# Patient Record
Sex: MALE | Race: WHITE | NOT HISPANIC OR LATINO | Employment: FULL TIME | URBAN - METROPOLITAN AREA
[De-identification: names, ages, dates, MRNs, and addresses within clinical notes are randomized per-mention and may not be internally consistent; named-entity substitution may affect disease eponyms.]

---

## 2022-05-11 ENCOUNTER — NURSE TRIAGE (OUTPATIENT)
Dept: NURSING | Facility: CLINIC | Age: 32
End: 2022-05-11
Payer: COMMERCIAL

## 2022-05-11 ENCOUNTER — OFFICE VISIT (OUTPATIENT)
Dept: URGENT CARE | Facility: URGENT CARE | Age: 32
End: 2022-05-11
Payer: COMMERCIAL

## 2022-05-11 VITALS
OXYGEN SATURATION: 98 % | RESPIRATION RATE: 20 BRPM | TEMPERATURE: 98 F | DIASTOLIC BLOOD PRESSURE: 78 MMHG | HEART RATE: 78 BPM | SYSTOLIC BLOOD PRESSURE: 119 MMHG

## 2022-05-11 DIAGNOSIS — K51.011 ULCERATIVE PANCOLITIS WITH RECTAL BLEEDING (H): Primary | ICD-10-CM

## 2022-05-11 LAB
BASOPHILS # BLD AUTO: 0.1 10E3/UL (ref 0–0.2)
BASOPHILS NFR BLD AUTO: 1 %
EOSINOPHIL # BLD AUTO: 0.3 10E3/UL (ref 0–0.7)
EOSINOPHIL NFR BLD AUTO: 4 %
ERYTHROCYTE [DISTWIDTH] IN BLOOD BY AUTOMATED COUNT: 13.4 % (ref 10–15)
HCT VFR BLD AUTO: 25.1 % (ref 40–53)
HGB BLD-MCNC: 7.5 G/DL (ref 13.3–17.7)
LYMPHOCYTES # BLD AUTO: 1.2 10E3/UL (ref 0.8–5.3)
LYMPHOCYTES NFR BLD AUTO: 20 %
MCH RBC QN AUTO: 24.8 PG (ref 26.5–33)
MCHC RBC AUTO-ENTMCNC: 29.9 G/DL (ref 31.5–36.5)
MCV RBC AUTO: 83 FL (ref 78–100)
MONOCYTES # BLD AUTO: 1.2 10E3/UL (ref 0–1.3)
MONOCYTES NFR BLD AUTO: 19 %
NEUTROPHILS # BLD AUTO: 3.4 10E3/UL (ref 1.6–8.3)
NEUTROPHILS NFR BLD AUTO: 55 %
PLATELET # BLD AUTO: 493 10E3/UL (ref 150–450)
RBC # BLD AUTO: 3.02 10E6/UL (ref 4.4–5.9)
WBC # BLD AUTO: 6.1 10E3/UL (ref 4–11)

## 2022-05-11 PROCEDURE — 85025 COMPLETE CBC W/AUTO DIFF WBC: CPT | Performed by: PHYSICIAN ASSISTANT

## 2022-05-11 PROCEDURE — 99204 OFFICE O/P NEW MOD 45 MIN: CPT | Performed by: PHYSICIAN ASSISTANT

## 2022-05-11 PROCEDURE — 36415 COLL VENOUS BLD VENIPUNCTURE: CPT | Performed by: PHYSICIAN ASSISTANT

## 2022-05-11 NOTE — TELEPHONE ENCOUNTER
Patient and wife calling to report reoccuring symptoms of previously diagnosed ulcerative colitis. Having five loose bloody stools a day.  Started mild 5-6-22 and progressed.  Denies vomiting, dizziness, fever.  Having abdominal pain 5-8 that varies during day.    Disposition is to ER.  Verbalizes understanding, but may choose to got to UC.    Lesa Butler RN  [facility]      Reason for Disposition    [1] Blood in the stool AND [2] moderate or large amount of blood    Additional Information    Negative: Shock suspected (e.g., cold/pale/clammy skin, too weak to stand, low BP, rapid pulse)    Negative: Difficult to awaken or acting confused (e.g., disoriented, slurred speech)    Negative: Sounds like a life-threatening emergency to the triager    Negative: Vomiting also present and worse than the diarrhea    Negative: [1] Blood in stool AND [2] without diarrhea    Negative: Diarrhea in a cancer patient who is currently (or recently) receiving chemotherapy or radiation therapy, or cancer patient who has metastatic or end-stage cancer and is receiving palliative care    Negative: [1] SEVERE abdominal pain (e.g., excruciating) AND [2] present > 1 hour    Negative: [1] SEVERE abdominal pain AND [2] age > 60    Protocols used: DIARRHEA-A-

## 2022-05-12 ENCOUNTER — APPOINTMENT (OUTPATIENT)
Dept: CT IMAGING | Facility: CLINIC | Age: 32
End: 2022-05-12
Attending: EMERGENCY MEDICINE
Payer: COMMERCIAL

## 2022-05-12 ENCOUNTER — HOSPITAL ENCOUNTER (OUTPATIENT)
Facility: CLINIC | Age: 32
Setting detail: OBSERVATION
Discharge: HOME | End: 2022-05-14
Attending: EMERGENCY MEDICINE | Admitting: INTERNAL MEDICINE
Payer: COMMERCIAL

## 2022-05-12 ENCOUNTER — APPOINTMENT (OUTPATIENT)
Dept: GENERAL RADIOLOGY | Facility: CLINIC | Age: 32
End: 2022-05-12
Attending: EMERGENCY MEDICINE
Payer: COMMERCIAL

## 2022-05-12 DIAGNOSIS — R52 PAIN: Primary | ICD-10-CM

## 2022-05-12 DIAGNOSIS — K51.919: ICD-10-CM

## 2022-05-12 DIAGNOSIS — D64.9 ANEMIA, UNSPECIFIED TYPE: ICD-10-CM

## 2022-05-12 LAB
ABO/RH(D): NORMAL
ABO/RH(D): NORMAL
ALBUMIN SERPL-MCNC: 3.3 G/DL (ref 3.4–5)
ALP SERPL-CCNC: 54 U/L (ref 40–150)
ALT SERPL W P-5'-P-CCNC: 18 U/L (ref 0–70)
ANION GAP SERPL CALCULATED.3IONS-SCNC: 6 MMOL/L (ref 3–14)
ANTIBODY SCREEN: NEGATIVE
AST SERPL W P-5'-P-CCNC: 15 U/L (ref 0–45)
BILIRUB DIRECT SERPL-MCNC: 0.1 MG/DL (ref 0–0.2)
BILIRUB SERPL-MCNC: 0.3 MG/DL (ref 0.2–1.3)
BUN SERPL-MCNC: 11 MG/DL (ref 7–30)
C COLI+JEJUNI+LARI FUSA STL QL NAA+PROBE: NOT DETECTED
C DIFF TOX B STL QL: NEGATIVE
CALCIUM SERPL-MCNC: 8.5 MG/DL (ref 8.5–10.1)
CHLORIDE BLD-SCNC: 105 MMOL/L (ref 94–109)
CO2 SERPL-SCNC: 25 MMOL/L (ref 20–32)
CREAT SERPL-MCNC: 0.94 MG/DL (ref 0.66–1.25)
CRP SERPL-MCNC: 16.4 MG/L (ref 0–8)
DEPRECATED CALCIDIOL+CALCIFEROL SERPL-MC: 16 UG/L (ref 20–75)
EC STX1 GENE STL QL NAA+PROBE: NOT DETECTED
EC STX2 GENE STL QL NAA+PROBE: NOT DETECTED
ERYTHROCYTE [DISTWIDTH] IN BLOOD BY AUTOMATED COUNT: 13.8 % (ref 10–15)
GFR SERPL CREATININE-BSD FRML MDRD: >90 ML/MIN/1.73M2
GLUCOSE BLD-MCNC: 92 MG/DL (ref 70–99)
HBV CORE IGM SERPL QL IA: NONREACTIVE
HBV SURFACE AB SERPL IA-ACNC: 29.64 M[IU]/ML
HCT VFR BLD AUTO: 28 % (ref 40–53)
HCV AB SERPL QL IA: NONREACTIVE
HGB BLD-MCNC: 7.5 G/DL (ref 13.3–17.7)
HGB BLD-MCNC: 8.4 G/DL (ref 13.3–17.7)
HOLD SPECIMEN: NORMAL
MCH RBC QN AUTO: 25 PG (ref 26.5–33)
MCHC RBC AUTO-ENTMCNC: 30 G/DL (ref 31.5–36.5)
MCV RBC AUTO: 83 FL (ref 78–100)
NOROV GI+II ORF1-ORF2 JNC STL QL NAA+PR: NOT DETECTED
PLATELET # BLD AUTO: 494 10E3/UL (ref 150–450)
POTASSIUM BLD-SCNC: 3.7 MMOL/L (ref 3.4–5.3)
PROT SERPL-MCNC: 6.6 G/DL (ref 6.8–8.8)
RBC # BLD AUTO: 3.36 10E6/UL (ref 4.4–5.9)
RVA NSP5 STL QL NAA+PROBE: NOT DETECTED
SALMONELLA SP RPOD STL QL NAA+PROBE: NOT DETECTED
SARS-COV-2 RNA RESP QL NAA+PROBE: NEGATIVE
SHIGELLA SP+EIEC IPAH STL QL NAA+PROBE: NOT DETECTED
SODIUM SERPL-SCNC: 136 MMOL/L (ref 133–144)
SPECIMEN EXPIRATION DATE: NORMAL
SPECIMEN EXPIRATION DATE: NORMAL
V CHOL+PARA RFBL+TRKH+TNAA STL QL NAA+PR: NOT DETECTED
WBC # BLD AUTO: 6.4 10E3/UL (ref 4–11)
Y ENTERO RECN STL QL NAA+PROBE: NOT DETECTED

## 2022-05-12 PROCEDURE — 36415 COLL VENOUS BLD VENIPUNCTURE: CPT | Performed by: PHYSICIAN ASSISTANT

## 2022-05-12 PROCEDURE — 96376 TX/PRO/DX INJ SAME DRUG ADON: CPT

## 2022-05-12 PROCEDURE — 99285 EMERGENCY DEPT VISIT HI MDM: CPT | Mod: 25

## 2022-05-12 PROCEDURE — 250N000011 HC RX IP 250 OP 636: Performed by: EMERGENCY MEDICINE

## 2022-05-12 PROCEDURE — 250N000013 HC RX MED GY IP 250 OP 250 PS 637: Performed by: NURSE PRACTITIONER

## 2022-05-12 PROCEDURE — 36415 COLL VENOUS BLD VENIPUNCTURE: CPT | Performed by: EMERGENCY MEDICINE

## 2022-05-12 PROCEDURE — 74177 CT ABD & PELVIS W/CONTRAST: CPT

## 2022-05-12 PROCEDURE — 86481 TB AG RESPONSE T-CELL SUSP: CPT | Performed by: NURSE PRACTITIONER

## 2022-05-12 PROCEDURE — 96375 TX/PRO/DX INJ NEW DRUG ADDON: CPT

## 2022-05-12 PROCEDURE — 86803 HEPATITIS C AB TEST: CPT | Performed by: NURSE PRACTITIONER

## 2022-05-12 PROCEDURE — G0378 HOSPITAL OBSERVATION PER HR: HCPCS

## 2022-05-12 PROCEDURE — 250N000011 HC RX IP 250 OP 636: Performed by: PHYSICIAN ASSISTANT

## 2022-05-12 PROCEDURE — 86705 HEP B CORE ANTIBODY IGM: CPT | Performed by: NURSE PRACTITIONER

## 2022-05-12 PROCEDURE — 258N000003 HC RX IP 258 OP 636: Performed by: PHYSICIAN ASSISTANT

## 2022-05-12 PROCEDURE — 86706 HEP B SURFACE ANTIBODY: CPT | Performed by: NURSE PRACTITIONER

## 2022-05-12 PROCEDURE — 87493 C DIFF AMPLIFIED PROBE: CPT | Mod: XU | Performed by: EMERGENCY MEDICINE

## 2022-05-12 PROCEDURE — 258N000003 HC RX IP 258 OP 636: Performed by: EMERGENCY MEDICINE

## 2022-05-12 PROCEDURE — 87506 IADNA-DNA/RNA PROBE TQ 6-11: CPT | Performed by: NURSE PRACTITIONER

## 2022-05-12 PROCEDURE — 82248 BILIRUBIN DIRECT: CPT | Performed by: EMERGENCY MEDICINE

## 2022-05-12 PROCEDURE — 85027 COMPLETE CBC AUTOMATED: CPT | Performed by: EMERGENCY MEDICINE

## 2022-05-12 PROCEDURE — 86140 C-REACTIVE PROTEIN: CPT | Performed by: NURSE PRACTITIONER

## 2022-05-12 PROCEDURE — 86901 BLOOD TYPING SEROLOGIC RH(D): CPT | Performed by: EMERGENCY MEDICINE

## 2022-05-12 PROCEDURE — 71045 X-RAY EXAM CHEST 1 VIEW: CPT

## 2022-05-12 PROCEDURE — 80053 COMPREHEN METABOLIC PANEL: CPT | Performed by: EMERGENCY MEDICINE

## 2022-05-12 PROCEDURE — C9803 HOPD COVID-19 SPEC COLLECT: HCPCS

## 2022-05-12 PROCEDURE — 120N000004 HC R&B MS OVERFLOW

## 2022-05-12 PROCEDURE — 99223 1ST HOSP IP/OBS HIGH 75: CPT | Mod: AI | Performed by: PHYSICIAN ASSISTANT

## 2022-05-12 PROCEDURE — 96374 THER/PROPH/DIAG INJ IV PUSH: CPT | Mod: XU

## 2022-05-12 PROCEDURE — 85018 HEMOGLOBIN: CPT | Performed by: PHYSICIAN ASSISTANT

## 2022-05-12 PROCEDURE — U0003 INFECTIOUS AGENT DETECTION BY NUCLEIC ACID (DNA OR RNA); SEVERE ACUTE RESPIRATORY SYNDROME CORONAVIRUS 2 (SARS-COV-2) (CORONAVIRUS DISEASE [COVID-19]), AMPLIFIED PROBE TECHNIQUE, MAKING USE OF HIGH THROUGHPUT TECHNOLOGIES AS DESCRIBED BY CMS-2020-01-R: HCPCS | Performed by: EMERGENCY MEDICINE

## 2022-05-12 PROCEDURE — 250N000013 HC RX MED GY IP 250 OP 250 PS 637: Performed by: PHYSICIAN ASSISTANT

## 2022-05-12 PROCEDURE — 250N000009 HC RX 250: Performed by: EMERGENCY MEDICINE

## 2022-05-12 PROCEDURE — 250N000013 HC RX MED GY IP 250 OP 250 PS 637: Performed by: EMERGENCY MEDICINE

## 2022-05-12 PROCEDURE — 36415 COLL VENOUS BLD VENIPUNCTURE: CPT | Performed by: NURSE PRACTITIONER

## 2022-05-12 PROCEDURE — 82306 VITAMIN D 25 HYDROXY: CPT | Performed by: NURSE PRACTITIONER

## 2022-05-12 RX ORDER — ACETAMINOPHEN 500 MG
1000 TABLET ORAL ONCE
Status: COMPLETED | OUTPATIENT
Start: 2022-05-12 | End: 2022-05-12

## 2022-05-12 RX ORDER — ACETAMINOPHEN 325 MG/1
650 TABLET ORAL EVERY 6 HOURS PRN
Status: DISCONTINUED | OUTPATIENT
Start: 2022-05-12 | End: 2022-05-14 | Stop reason: HOSPADM

## 2022-05-12 RX ORDER — ONDANSETRON 2 MG/ML
4 INJECTION INTRAMUSCULAR; INTRAVENOUS EVERY 30 MIN PRN
Status: DISCONTINUED | OUTPATIENT
Start: 2022-05-12 | End: 2022-05-12

## 2022-05-12 RX ORDER — OXYCODONE HYDROCHLORIDE 5 MG/1
5 TABLET ORAL EVERY 4 HOURS PRN
Status: DISCONTINUED | OUTPATIENT
Start: 2022-05-12 | End: 2022-05-14 | Stop reason: HOSPADM

## 2022-05-12 RX ORDER — HYDROMORPHONE HYDROCHLORIDE 1 MG/ML
0.5 INJECTION, SOLUTION INTRAMUSCULAR; INTRAVENOUS; SUBCUTANEOUS
Status: COMPLETED | OUTPATIENT
Start: 2022-05-12 | End: 2022-05-12

## 2022-05-12 RX ORDER — ONDANSETRON 4 MG/1
4 TABLET, ORALLY DISINTEGRATING ORAL EVERY 6 HOURS PRN
Status: DISCONTINUED | OUTPATIENT
Start: 2022-05-12 | End: 2022-05-14 | Stop reason: HOSPADM

## 2022-05-12 RX ORDER — LIDOCAINE 40 MG/G
CREAM TOPICAL
Status: DISCONTINUED | OUTPATIENT
Start: 2022-05-12 | End: 2022-05-14 | Stop reason: HOSPADM

## 2022-05-12 RX ORDER — HYDROMORPHONE HYDROCHLORIDE 1 MG/ML
0.5 INJECTION, SOLUTION INTRAMUSCULAR; INTRAVENOUS; SUBCUTANEOUS
Status: DISCONTINUED | OUTPATIENT
Start: 2022-05-12 | End: 2022-05-12

## 2022-05-12 RX ORDER — SODIUM CHLORIDE, SODIUM LACTATE, POTASSIUM CHLORIDE, CALCIUM CHLORIDE 600; 310; 30; 20 MG/100ML; MG/100ML; MG/100ML; MG/100ML
INJECTION, SOLUTION INTRAVENOUS CONTINUOUS
Status: DISCONTINUED | OUTPATIENT
Start: 2022-05-12 | End: 2022-05-14 | Stop reason: HOSPADM

## 2022-05-12 RX ORDER — IOPAMIDOL 755 MG/ML
500 INJECTION, SOLUTION INTRAVASCULAR ONCE
Status: COMPLETED | OUTPATIENT
Start: 2022-05-12 | End: 2022-05-12

## 2022-05-12 RX ORDER — ZOLPIDEM TARTRATE 5 MG/1
5 TABLET ORAL
Status: DISCONTINUED | OUTPATIENT
Start: 2022-05-12 | End: 2022-05-14 | Stop reason: HOSPADM

## 2022-05-12 RX ORDER — HYDROMORPHONE HCL IN WATER/PF 6 MG/30 ML
0.2 PATIENT CONTROLLED ANALGESIA SYRINGE INTRAVENOUS
Status: DISCONTINUED | OUTPATIENT
Start: 2022-05-12 | End: 2022-05-14 | Stop reason: HOSPADM

## 2022-05-12 RX ORDER — SODIUM CHLORIDE 9 MG/ML
INJECTION, SOLUTION INTRAVENOUS CONTINUOUS
Status: DISCONTINUED | OUTPATIENT
Start: 2022-05-12 | End: 2022-05-12

## 2022-05-12 RX ORDER — ONDANSETRON 2 MG/ML
4 INJECTION INTRAMUSCULAR; INTRAVENOUS EVERY 6 HOURS PRN
Status: DISCONTINUED | OUTPATIENT
Start: 2022-05-12 | End: 2022-05-14 | Stop reason: HOSPADM

## 2022-05-12 RX ORDER — UBIDECARENONE 75 MG
100 CAPSULE ORAL DAILY
Status: DISCONTINUED | OUTPATIENT
Start: 2022-05-12 | End: 2022-05-14 | Stop reason: HOSPADM

## 2022-05-12 RX ADMIN — VITAM B12 100 MCG: 100 TAB at 11:57

## 2022-05-12 RX ADMIN — ONDANSETRON 4 MG: 2 INJECTION INTRAMUSCULAR; INTRAVENOUS at 17:22

## 2022-05-12 RX ADMIN — ONDANSETRON 4 MG: 2 INJECTION INTRAMUSCULAR; INTRAVENOUS at 09:54

## 2022-05-12 RX ADMIN — IOPAMIDOL 81 ML: 755 INJECTION, SOLUTION INTRAVENOUS at 10:39

## 2022-05-12 RX ADMIN — HYDROMORPHONE HYDROCHLORIDE 0.2 MG: 0.2 INJECTION, SOLUTION INTRAMUSCULAR; INTRAVENOUS; SUBCUTANEOUS at 22:38

## 2022-05-12 RX ADMIN — HYDROMORPHONE HYDROCHLORIDE 0.5 MG: 1 INJECTION, SOLUTION INTRAMUSCULAR; INTRAVENOUS; SUBCUTANEOUS at 16:53

## 2022-05-12 RX ADMIN — SODIUM CHLORIDE 54 ML: 9 INJECTION, SOLUTION INTRAVENOUS at 10:39

## 2022-05-12 RX ADMIN — HYDROMORPHONE HYDROCHLORIDE 0.5 MG: 1 INJECTION, SOLUTION INTRAMUSCULAR; INTRAVENOUS; SUBCUTANEOUS at 12:52

## 2022-05-12 RX ADMIN — HYDROMORPHONE HYDROCHLORIDE 0.5 MG: 1 INJECTION, SOLUTION INTRAMUSCULAR; INTRAVENOUS; SUBCUTANEOUS at 10:12

## 2022-05-12 RX ADMIN — ACETAMINOPHEN 1000 MG: 500 TABLET, FILM COATED ORAL at 16:49

## 2022-05-12 RX ADMIN — SODIUM CHLORIDE: 9 INJECTION, SOLUTION INTRAVENOUS at 09:53

## 2022-05-12 RX ADMIN — HYDROMORPHONE HYDROCHLORIDE 0.2 MG: 0.2 INJECTION, SOLUTION INTRAMUSCULAR; INTRAVENOUS; SUBCUTANEOUS at 20:26

## 2022-05-12 RX ADMIN — SODIUM CHLORIDE, POTASSIUM CHLORIDE, SODIUM LACTATE AND CALCIUM CHLORIDE: 600; 310; 30; 20 INJECTION, SOLUTION INTRAVENOUS at 19:12

## 2022-05-12 ASSESSMENT — ACTIVITIES OF DAILY LIVING (ADL)
ADLS_ACUITY_SCORE: 35

## 2022-05-12 ASSESSMENT — ENCOUNTER SYMPTOMS
ANAL BLEEDING: 1
ABDOMINAL PAIN: 1

## 2022-05-12 NOTE — PHARMACY-ADMISSION MEDICATION HISTORY
Admission medication history interview status for this patient is complete. See [facility] admission navigator for allergy information, prior to admission medications and immunization status.     Medication history interview done, indicate source(s): Patient  Medication history resources (including written lists, pill bottles, clinic record):None  Pharmacy: None identified    Patient reports NO medications prior to admission  Actions taken by pharmacist (provider contacted, etc):None     Additional medication history information:None    Medication reconciliation/reorder completed by provider prior to medication history?  N    Prior to Admission medications    Not on File

## 2022-05-12 NOTE — ED NOTES
[facility]  ED Nurse Handoff Report    Bear Abdi is a 32 year old male   ED Chief complaint: Rectal Bleeding  . ED Diagnosis:   Final diagnoses:   Exacerbation of ulcerative colitis with complication (H)     Allergies: No Known Allergies    Code Status: Full Code  Activity level - Baseline/Home:  Independent. Activity Level - Current:   Independent. Lift room needed: No. Bariatric: No   Needed: No   Isolation: No. Infection: Not Applicable.     Vital Signs:   Vitals:    05/12/22 0914   BP: 138/84   Pulse: 100   Resp: 18   Temp: 98.7  F (37.1  C)   TempSrc: Oral   SpO2: 100%   Weight: 72.6 kg (160 lb)       Cardiac Rhythm:  ,      Pain level:    Patient confused: No. Patient Falls Risk: Yes.   Elimination Status: has not yet voided   Patient Report - Initial Complaint: rectal bleeding. Focused Assessment: Bear Abdi is a 32 year old male with history of ulcerative colitis who presents with lower abdominal pain and rectal bleeding. The patient states that about 2 weeks ago he developed lower abdominal pain that is now waxing and waning. At it's worst he notes brief excruciating sharp pain. About a week after onset he developed rectal bleeding of about 5-6 episodes. He was seen at [facility] last night and found to have a hemoglobin of 7.5. Regarding his history with ulcerative colitis, has not pursued active treatment in several years. He does not have a current GI doctor. The patient was last on Remicade infusion about 2 years ago and his last Prednisone course was prior to his Remicade treatment. He denies any surgical intervention but he was told that if this is not under control he may need to.    Tests Performed: labs, imaging. Abnormal Results:   CT Abdomen Pelvis w Contrast    (Results Pending)   XR Chest Port 1 View    (Results Pending)     Labs Ordered and Resulted from Time of ED Arrival to Time of ED Departure   CBC WITH PLATELETS - Abnormal       Result Value    WBC Count  6.4      RBC Count 3.36 (*)     Hemoglobin 8.4 (*)     Hematocrit 28.0 (*)     MCV 83      MCH 25.0 (*)     MCHC 30.0 (*)     RDW 13.8      Platelet Count 494 (*)    BASIC METABOLIC PANEL - Normal    Sodium 136      Potassium 3.7      Chloride 105      Carbon Dioxide (CO2) 25      Anion Gap 6      Urea Nitrogen 11      Creatinine 0.94      Calcium 8.5      Glucose 92      GFR Estimate >90     COVID-19 VIRUS (CORONAVIRUS) BY PCR   TYPE AND SCREEN, ADULT    ABO/RH(D) A POS      Antibody Screen Negative      SPECIMEN EXPIRATION DATE [id number]     ABO AND RH    ABO/RH(D) A POS      SPECIMEN EXPIRATION DATE [id number]     CLOSTRIDIUM DIFFICILE TOXIN B   ABO/RH TYPE AND SCREEN   .   Treatments provided: zofran, dilaudid, fluids  Family Comments: wife updated by pt. Wife is RN.   OBS brochure/video discussed/provided to patient:  N/A  ED Medications:   Medications   sodium chloride 0.9% infusion ( Intravenous New Bag 5/12/22 0953)   ondansetron (ZOFRAN) injection 4 mg (4 mg Intravenous Given 5/12/22 0954)   HYDROmorphone (PF) (DILAUDID) injection 0.5 mg (0.5 mg Intravenous Given 5/12/22 1012)   iopamidol (ISOVUE-370) solution 500 mL (81 mLs Intravenous Given 5/12/22 1039)   CT scan flush use (54 mLs As instructed Given 5/12/22 1039)     Drips infusing:  Yes  For the majority of the shift, the patient's behavior Green. Interventions performed were NA.    Sepsis treatment initiated: No     Patient tested for COVID 19 prior to admission: YES    ED Nurse Name/Phone Number: Cecily Solis RN,   11:02 AM  RECEIVING UNIT ED HANDOFF REVIEW    Above ED Nurse Handoff Report was reviewed: Yes  Reviewed by: Ashley Pillai RN on May 12, 2022 at 6:49 PM

## 2022-05-12 NOTE — H&P
History and Physical     Bear Abdi MRN# [id number]   YOB: 1990 Age: 32 year old      Date of Admission:  5/12/2022    Primary care provider: No Ref-Primary, Physician          Assessment and Plan:   Bear Abdi is a 32 year old male with a PMH significant for untreated ulcerative colitis and anemia of chronic disease who presents with abdominal pain and hematochezia.     Patient was discussed with Dr. Grey, who was provider in ED. Chart review of ED work up was reviewed as well as chart review of Care Everywhere, previous visits and admissions.     #Abdominal pain and hematochezia consistent with ulcerative colitis flare  #History of ulcerative colitis  -Patient reports history of ulcerative colitis but has not been on any treatment recently  -Describes 2 weeks of worsening abdominal pain along with 4-5 loose stools and bleeding  -No fever, cough or shortness of breath.  WBC is normal  -GI was called from the emergency room requesting C. difficile testing before steroids are started  -CT scan shows moderate colitis consistent with history of ulcerative colitis without obstruction or abscess  -GI consult  -Clear liquid diet  -LR at 100 ml/hr  -Tylenol, Oxycodone and dilaudid for pain    #Acute on chronic anemia  -Describes shortness of breath with exertion and fatigue  -Is vitally stable  -Most recent hemoglobin in June 2020 was 12 with current hemoglobin of 8.4  -Reports history of blood transfusion and is agreeable to another if needed  -Conditional unit if  Hgb <7      Social: No concerns  Code: Discussed with patient and they have chosen full code  VTE prophylaxis: PCDs given active bleeding  Disposition: Inpatient                    Chief Complaint:   Abdominal pain and hematochezia         History of Present Illness:   Bear Abdi is a 32 year old male who presents with approximately 2 weeks of cramping lower abdominal pain associated with 4-5 loose stools and bleeding. Bleeding can  be bright red or marroonish. Eating/drinking doesn't seem to affect it. He has noted increased exertional shortness of breath and fatigue. He denies fever, shortness of breath, cough and chest pain. Does not us NSAIDs, smoke cigarettes or drink alcohol daily.              Past Medical History:   Ulcerative colitis            Past Surgical History:   No significant prior surgical hx          Social History:     Social History     Socioeconomic History     Marital status:      Spouse name: Not on file     Number of children: Not on file     Years of education: Not on file     Highest education level: Not on file   Occupational History     Not on file   Tobacco Use     Smoking status: Not on file     Smokeless tobacco: Not on file   Substance and Sexual Activity     Alcohol use: Not on file     Drug use: Not on file     Sexual activity: Not on file   Other Topics Concern     Not on file   Social History Narrative     Not on file     Social Determinants of Health     Financial Resource Strain: Not on file   Food Insecurity: Not on file   Transportation Needs: Not on file   Physical Activity: Not on file   Stress: Not on file   Social Connections: Not on file   Intimate Partner Violence: Not on file   Housing Stability: Not on file               Family History:   Family history reviewed and is non contributory         Allergies:    No Known Allergies            Medications:     Prior to Admission medications    Not on File              Review of Systems:   A Comprehensive greater than 10 system review of systems was carried out.  Pertinent positives and negatives are noted above.  Otherwise negative for contributory information.            Physical Exam:   Blood pressure 138/84, pulse 100, temperature 98.7  F (37.1  C), temperature source Oral, resp. rate 18, weight 72.6 kg (160 lb), SpO2 100 %.  Exam:  GENERAL:  Comfortable.  PSYCH: pleasant, oriented, No acute distress.  HEENT:  PERRLA. Normal conjunctiva,  normal hearing, nasal mucosa and Oropharynx are normal.  NECK:  Supple, no neck vein distention, adenopathy or bruits, normal thyroid.  HEART:  Normal S1, S2 with no murmur, no pericardial rub, gallops or S3 or S4.  LUNGS:  Clear to auscultation, normal Respiratory effort. No wheezing, rales or ronchi.  ABDOMEN:  Soft, no hepatosplenomegaly, hypoactive bowel sounds. Tender to touch and non distended  EXTREMITIES:  No pedal edema, +2 pulses bilateral and equal.  SKIN:  Dry to touch, No rash, wound or ulcerations.  NEUROLOGIC:  CN 2-12 grossly intact, sensation is intact with no focal deficits.               Data:     Recent Labs   Lab 05/12/22 0930 05/11/22 2018   WBC 6.4 6.1   HGB 8.4* 7.5*   HCT 28.0* 25.1*   MCV 83 83   * 493*     Recent Labs   Lab 05/12/22  0930      POTASSIUM 3.7   CHLORIDE 105   CO2 25   ANIONGAP 6   GLC 92   BUN 11   CR 0.94   GFRESTIMATED >90   RUTH 8.5         Recent Results (from the past 24 hour(s))   CT Abdomen Pelvis w Contrast    Narrative    CT ABDOMEN AND PELVIS WITH CONTRAST 5/12/2022 10:48 AM    CLINICAL HISTORY: Abdominal abscess/infection suspected. History of  ulcerative colitis. Diffuse pain, bloody stools.    TECHNIQUE: CT scan of the abdomen and pelvis was performed following  injection of IV contrast. Multiplanar reformats were obtained. Dose  reduction techniques were used.  CONTRAST: 81mL Isovue-370    COMPARISON: None.    FINDINGS:   LOWER CHEST: Normal.    HEPATOBILIARY: Normal.    PANCREAS: Normal.    SPLEEN: Subcentimeter splenic lesions are indeterminate, but commonly  benign.    ADRENAL GLANDS: Normal.    KIDNEYS/BLADDER: Normal.    BOWEL: Diffuse colonic wall thickening most severe in the sigmoid,  descending, and transverse colon. Engorgement of the adjacent vasa  recta. Numerous enlarged reactive lymph nodes in the adjacent  mesocolon.    PELVIC ORGANS: Normal.    ADDITIONAL FINDINGS: None.    MUSCULOSKELETAL: Normal.      Impression    IMPRESSION:    1.  Moderate colitis consistent with history of ulcerative colitis. No  obstruction or abscess.    SUMEET ULLOA MD         SYSTEM ID:  [id number]   XR Chest Port 1 View    Narrative    XR CHEST PORT 1 VIEW  5/12/2022 11:16 AM       INDICATION: covid +  COMPARISON: None.       Impression    IMPRESSION: Negative chest.    SUMEET ULLOA MD         SYSTEM ID:  [id number]         Tsering Polo PA-C    This patient was seen and discussed with Dr. Sánchez who agrees with the current plans as outlined above.

## 2022-05-12 NOTE — CONSULTS
GASTROENTEROLOGY CONSULTATION      Bear Abdi  [address]  [place] [address]  32 year old male     Admission Date/Time: 5/12/2022  Primary Care Provider: No Ref-Primary, Physician     We were asked to see the patient in consultation by Dr. Mendoza for evaluation of ulcerative colitis.    CC: Abdominal pain, and rectal bleeding     HPI:  Bear Abdi is a 32 year old male with a history of ulcerative colitis who presented to the emergency department with lower abdominal pain and rectal bleeding.         Patient reports that he started with a severe, sharp, intermittent lower abdominal pain about 3 weeks ago.  This progressively gotten worse and started having bloody stools around 2 weeks ago.  Notably, he was seen in urgent care yesterday for the similar symptoms and has had a blood work which was notable for hemoglobin of 7.4.  And was recommended to follow-up in emergency department for further evaluation and management of ulcerative colitis.  Patient denies use of NSAID's on a regular basis.  Denies family history of ulcerative colitis or other GI disorders.    Patient reports that he was initially diagnosed with ulcerative colitis in 2018.  He was on Remicade infusion every 2 months.  This slowly improved his symptoms.  However, the Remicade infusion was discontinued approximately couple of years ago. He has not been on any other medications since then.    Initial colonoscopy was in September 2018 which showed severe pancolitis.  He had a follow-up flexible sigmoidoscopy in December 2018 which again demonstrated severe ulcerative colitis with Myers score of 3.                                             The admission labs are notable for hemoglobin 8.4, hematocrit 28, platelets 494, RBC 3.36, MCV 83.    CT abdomen and pelvis from today showed moderate colitis consistent with history of ulcerative colitis.       PAST MEDICAL HISTORY:  Patient Active Problem List    Diagnosis Date Noted     Exacerbation of  ulcerative colitis with complication (H) 05/12/2022     Priority: Medium          ROS: A comprehensive ten point review of systems was negative aside from those in mentioned in the HPI.       MEDICATIONS:   Prior to Admission medications    Not on File        ALLERGIES: No Known Allergies     SOCIAL HISTORY:        FAMILY HISTORY:  No family history on file.     Denies family history of ulcerative colitis     PHYSICAL EXAM:   /84   Pulse 100   Temp 98.7  F (37.1  C) (Oral)   Resp 18   Wt 72.6 kg (160 lb)   SpO2 100%      General: alert, oriented, NAD  SKIN: no suspicious lesions, rashes, jaundice, or spider angiomas  EYES: No scleral icterus  RESPIRATORY: Non labored breathing, Lungs clear  CARDIOVASCULAR:  RRR. No murmurs, clicks gallops or rub  GASTROINTESTINAL: Active bowel sounds, bilateral lower abdominal tenderness on palpation  JOINT/EXTREMITIES: extremities normal- no gross deformities noted.  No edema  NEURO: Grossly WNL.  PSYCH: no abnormal anxiety/depression       LABS:  I reviewed the patient's new clinical lab test results.   Recent Labs   Lab Test 05/12/22  0930 05/11/22 2018   WBC 6.4 6.1   HGB 8.4* 7.5*   MCV 83 83   * 493*     Recent Labs   Lab Test 05/12/22  0930      POTASSIUM 3.7   CHLORIDE 105   CO2 25   BUN 11   ANIONGAP 6   RUTH 8.5     No lab results found.     IMAGING/PROCEDURES:  Flex Sigmoidoscopy 12/11/2018:  Impression:  - Ulcerative colitis. Inflammation was found from the                        rectum to the sigmoid colon. This was graded as Myers                        Score 3 (severe disease), unchanged compared to                        previous examinations. Biopsied.   Final Pathologic Diagnosis   Colon, random, biopsy --       1.  Chronic active colitis       2.  No dysplasia or malignancy seen     CT abdomen and pelvis 5/12/2022:    IMPRESSION:   1.  Moderate colitis consistent with history of ulcerative colitis. No  obstruction or abscess.    I personally  reviewed the patient's new imaging results.    Problem list pertaining to GI:  Ulcerative colitis    Assessment: This is a 32-year-old male patient with a history of ulcerative colitis.  He was previously treated with Remicade infusion however this has been discontinued due to insurance coverage.  He is here in the emergency department with abdominal pain, and rectal bleeding.  Labs completed in the emergency department is notable for hemoglobin of 8.4, WBC 6.4, platelets 494.  Basic metabolic panel within normal limits.     His last flexible sigmoidoscopy was completed in December 2018 which was notable for ulcerative colitis. Inflammation was found from the rectum to the sigmoid colon.  The Myers score of 3.  The biopsies were positive for chronic active colitis.    Impression: It is likely that he has ulcerative colitis flare without appropriate treatment.  Other differentials considered are infectious colitis, and ischemic colitis.    Plan:  -Clear liquids for now  -N.p.o.after midnight  -Flexible sigmoidoscopy tomorrow  -Comprehensive stool panel  -Monitor hemoglobin and replace if needed per attending  -Pain management and IV fluids per attending  -Pre lab to restart biologic therapy   -IV steroids Solu-Medrol 20 mg every 8 hours.    I will discuss with Dr. Read    Thank you for allowing me to participate in the care of this patient.  Please contact me with any questions or concerns.    Total time spent:  Approximately, 40 minutes was spent providing patient care, including patient evaluation, reviewing documentation/test results, and . Thank you for asking us to participate in the care of this patient.      Jocelyn Ventura CNP   [facility] ([facility])  [phone]    -------------------  I agree with the assessment and plan of Jocelyn Ventura CNP.  Patient with history of UC, followed at health [facility] in the past, diagnosed in 2018 was on remicade for a couple of years and then he  stopped as he lost his insurance and it was cost prohibitive.  He was feeling well untila couple of weeks ago, with abdominal pain and rectal bleeding.      On exam he is pale, NAD, pleasant and cooperative, abd is tender in lower abd, nabs, heart rrrr, lungs ctab    C.diff is negative, CT scan with colitis and most prominent in left side of colon.    A/P  UC flare.  Agree with steroids, flex sig tomorrow.  Clear liquids okay today, NPO at midnight.    This was a shared visit with CNP, I spent about 25 minutes in the care of this patient.    Mike Read MD  [facility]

## 2022-05-12 NOTE — PROGRESS NOTES
Assessment & Plan     1. Ulcerative pancolitis with rectal bleeding (H)  Hgb at 7.5 with multiple bloody stools per day   Advised ER for further care  - CBC with platelets and differential; Future  - CBC with platelets and differential      DAVIDSON Hahn [facility] [place]    CHIEF COMPLAINT:   Chief Complaint   Patient presents with     Urgent Care     Wants blood done pt says he has colitis      Subjective     Bear is a 32 year old male who presents to clinic today for evaluation. Patient has history of ulcerative colitis. He was being treated with Remicade infusions and was doing well. He has an issue with his insurance and ultimately stopped the infusions, over one year ago.  For the past 2 weeks has had abdominal pain, and felt that a flare was imminent. Pain in his abdomen has increased, and for the last 1-1.5 weeks he has had bloody diarrhea 5-6 times per day. Endorses feeling dizzy and fatigued.          History reviewed. No pertinent past medical history.  No past surgical history on file.  Social History     Tobacco Use     Smoking status: Not on file     Smokeless tobacco: Not on file   Substance Use Topics     Alcohol use: Not on file     No current outpatient medications on file.     No current facility-administered medications for this visit.     No Known Allergies    10 point ROS of systems were all negative except for pertinent positives noted in my HPI.      Exam:   /78   Pulse 78   Temp 98  F (36.7  C)   Resp 20   SpO2 98%   Constitutional:  alert and no distress. Pale.   Head: Normocephalic, atraumatic.  Eyes: conjunctiva clear, no drainage  ENT: MMM  Neck: neck is supple, no cervical lymphadenopathy or nuchal rigidity  Cardiovascular: RRR  Respiratory: CTA bilaterally, no rhonchi or rales  Gastrointestinal: soft. Moderate discomfort throughout  Skin: no rashes  Neurologic: Speech clear, gait normal. Moves all extremities.    Results for orders placed or  performed in visit on 05/11/22   CBC with platelets and differential     Status: Abnormal   Result Value Ref Range    WBC Count 6.1 4.0 - 11.0 10e3/uL    RBC Count 3.02 (L) 4.40 - 5.90 10e6/uL    Hemoglobin 7.5 (LL) 13.3 - 17.7 g/dL    Hematocrit 25.1 (L) 40.0 - 53.0 %    MCV 83 78 - 100 fL    MCH 24.8 (L) 26.5 - 33.0 pg    MCHC 29.9 (L) 31.5 - 36.5 g/dL    RDW 13.4 10.0 - 15.0 %    Platelet Count 493 (H) 150 - 450 10e3/uL    % Neutrophils 55 %    % Lymphocytes 20 %    % Monocytes 19 %    % Eosinophils 4 %    % Basophils 1 %    Absolute Neutrophils 3.4 1.6 - 8.3 10e3/uL    Absolute Lymphocytes 1.2 0.8 - 5.3 10e3/uL    Absolute Monocytes 1.2 0.0 - 1.3 10e3/uL    Absolute Eosinophils 0.3 0.0 - 0.7 10e3/uL    Absolute Basophils 0.1 0.0 - 0.2 10e3/uL   CBC with platelets and differential     Status: Abnormal    Narrative    The following orders were created for panel order CBC with platelets and differential.  Procedure                               Abnormality         Status                     ---------                               -----------         ------                     CBC with platelets and d...[[id number]]  Abnormal            Final result                 Please view results for these tests on the individual orders.

## 2022-05-12 NOTE — ED PROVIDER NOTES
History   Chief Complaint:  Rectal Bleeding       HPI   Bear Abdi is a 32 year old male with history of ulcerative colitis who presents with lower abdominal pain and rectal bleeding. The patient states that about 2 weeks ago he developed lower abdominal pain that is now waxing and waning. At it's worst he notes brief excruciating sharp pain. About a week after onset he developed rectal bleeding of about 5-6 episodes. He was seen at [facility] last night and found to have a hemoglobin of 7.5. Regarding his history with ulcerative colitis, has not pursued active treatment in several years. He does not have a current GI doctor. The patient was last on Remicade infusion about 2 years ago and his last Prednisone course was prior to his Remicade treatment. He denies any surgical intervention but he was told that if this is not under control he may need to.     Review of Systems   Gastrointestinal: Positive for abdominal pain and anal bleeding.   All other systems reviewed and are negative.    Allergies:  The patient has no known allergies.     Medications:  The patient is not currently taking any prescribed medications.    Past Medical History:     IBD  Anemia  Ulcerative pancolitis  Alcohol abuse  ADHD    Family History:    Father: Heart failure, parkinsonism  Mother: Stroke, CAD    Social History:  The patient presents to the ED alone.  . Lives in [place], [place]    Physical Exam     Patient Vitals for the past 24 hrs:   BP Temp Temp src Pulse Resp SpO2 Weight   05/12/22 1100 (!) 140/84 -- -- 85 -- 98 % --   05/12/22 1000 118/85 -- -- 86 -- 98 % --   05/12/22 0914 138/84 98.7  F (37.1  C) Oral 100 18 100 % 72.6 kg (160 lb)     Physical Exam  General: Patient is alert and cooperative.  HENT:  Normal nose, oropharynx. Moist oral mucosa.  Eyes: EOMI. Normal conjunctiva.  Neck:  Normal range of motion and appearance.   Cardiovascular:  Rate 100 bpm.   Pulmonary/Chest:  Effort normal.   Abdominal: Soft. No  distension; diffuse tenderness, no guarding.    Musculoskeletal: Normal range of motion. No edema or tenderness.   Neurological: oriented, normal strength, sensation, and coordination.   Skin: Warm and dry. No rash or bruising.   Psychiatric: Normal mood and affect. Normal behavior and judgement.    Emergency Department Course     Imaging:  XR Chest Port 1 View   Final Result   IMPRESSION: Negative chest.      SUMEET ULLOA MD            SYSTEM ID:  [id number]      CT Abdomen Pelvis w Contrast   Final Result   IMPRESSION:    1.  Moderate colitis consistent with history of ulcerative colitis. No   obstruction or abscess.      SUMEET ULLOA MD            SYSTEM ID:  [id number]        Report per radiology    Laboratory:  Labs Ordered and Resulted from Time of ED Arrival to Time of ED Departure   CBC WITH PLATELETS - Abnormal       Result Value    WBC Count 6.4      RBC Count 3.36 (*)     Hemoglobin 8.4 (*)     Hematocrit 28.0 (*)     MCV 83      MCH 25.0 (*)     MCHC 30.0 (*)     RDW 13.8      Platelet Count 494 (*)    CRP INFLAMMATION - Abnormal    CRP Inflammation 16.4 (*)    BASIC METABOLIC PANEL - Normal    Sodium 136      Potassium 3.7      Chloride 105      Carbon Dioxide (CO2) 25      Anion Gap 6      Urea Nitrogen 11      Creatinine 0.94      Calcium 8.5      Glucose 92      GFR Estimate >90     COVID-19 VIRUS (CORONAVIRUS) BY PCR - Normal    SARS CoV2 PCR Negative     ERYTHROCYTE SEDIMENTATION RATE AUTO   HEPATITIS B SURFACE ANTIBODY   HEPATITIS C ANTIBODY   HEPATITIS B CORE ANTIBODY IGM   VITAMIN D DEFICIENCY SCREENING   TYPE AND SCREEN, ADULT    ABO/RH(D) A POS      Antibody Screen Negative      SPECIMEN EXPIRATION DATE [id number]     ABO AND RH    ABO/RH(D) A POS      SPECIMEN EXPIRATION DATE [id number]     CLOSTRIDIUM DIFFICILE TOXIN B   ENTERIC BACTERIA AND VIRUS PANEL BY FLAKO STOOL   QUANTIFERON TB GOLD PLUS GREY TUBE   QUANTIFERON TB GOLD PLUS GREEN TUBE   QUANTIFERON TB GOLD PLUS YELLOW TUBE    QUANTIFERON TB GOLD PLUS PURPLE TUBE   ABO/RH TYPE AND SCREEN   QUANTIFERON-TB GOLD PLUS     Emergency Department Course:         Reviewed:  I reviewed nursing notes, vitals and past medical history    Assessments:  0947 I obtained history and examined the patient as noted above.     1054 I rechecked the patient and explained findings.     Consults:  0959 I spoke with Dr. Read [facility], regarding the patient's presentation.    1026 I spoke with Elba Polo PA-C for Dr. Sánchez, hospitalist, who accepts the patient.     Interventions:  0953 NS 1L IV  0954 Zofran 4 mg IV  1012 Dilaudid 0.5 mg IV  1157 Vitamin B-12 100 mcg PO  1252 Dilaudid 0.5 mg IV    Disposition:  The patient was admitted to the hospital under the care of Dr. Sánchez.     Impression & Plan     Medical Decision Making:  Afebrile 32-year-old male with a history of ulcerative colitis has presented with a 2-week history of abdominal pain and 1 week history of daily bloody stools.  CT abdomen pelvis shows pancolitis with no abscess, fistula, or other complication.  He will require admission for symptom control and stabilization of his ulcerative colitis flareup.  GI has requested stool test to rule out C. Difficile before initiating systemic corticosteroids. He is receiving IV fluids, nausea and pain medication will be admitted to the hospitalist service with GI consultation.        Diagnosis:    ICD-10-CM    1. Exacerbation of ulcerative colitis with complication (H)  K51.919      Scribe Disclosure:  RAMON, Mitch Mendoza, am serving as a scribe at 9:35 AM on 5/12/2022 to document services personally performed by Frankie Grey MD based on my observations and the provider's statements to me.          Frankie Grey MD  05/12/22 7955       Frankie Grey MD  05/12/22 1417

## 2022-05-13 LAB
ANION GAP SERPL CALCULATED.3IONS-SCNC: 5 MMOL/L (ref 3–14)
BUN SERPL-MCNC: 8 MG/DL (ref 7–30)
CALCIUM SERPL-MCNC: 8.4 MG/DL (ref 8.5–10.1)
CHLORIDE BLD-SCNC: 106 MMOL/L (ref 94–109)
CO2 SERPL-SCNC: 26 MMOL/L (ref 20–32)
CREAT SERPL-MCNC: 0.95 MG/DL (ref 0.66–1.25)
ERYTHROCYTE [DISTWIDTH] IN BLOOD BY AUTOMATED COUNT: 13.7 % (ref 10–15)
FLEXIBLE SIGMOIDOSCOPY: NORMAL
GFR SERPL CREATININE-BSD FRML MDRD: >90 ML/MIN/1.73M2
GLUCOSE BLD-MCNC: 81 MG/DL (ref 70–99)
HCT VFR BLD AUTO: 25.9 % (ref 40–53)
HGB BLD-MCNC: 7.6 G/DL (ref 13.3–17.7)
MCH RBC QN AUTO: 24.8 PG (ref 26.5–33)
MCHC RBC AUTO-ENTMCNC: 29.3 G/DL (ref 31.5–36.5)
MCV RBC AUTO: 84 FL (ref 78–100)
PLATELET # BLD AUTO: 389 10E3/UL (ref 150–450)
POTASSIUM BLD-SCNC: 4.3 MMOL/L (ref 3.4–5.3)
RBC # BLD AUTO: 3.07 10E6/UL (ref 4.4–5.9)
SODIUM SERPL-SCNC: 137 MMOL/L (ref 133–144)
WBC # BLD AUTO: 5.5 10E3/UL (ref 4–11)

## 2022-05-13 PROCEDURE — 250N000011 HC RX IP 250 OP 636: Performed by: INTERNAL MEDICINE

## 2022-05-13 PROCEDURE — 258N000003 HC RX IP 258 OP 636: Performed by: PHYSICIAN ASSISTANT

## 2022-05-13 PROCEDURE — G0378 HOSPITAL OBSERVATION PER HR: HCPCS

## 2022-05-13 PROCEDURE — 88305 TISSUE EXAM BY PATHOLOGIST: CPT | Mod: 26 | Performed by: PATHOLOGY

## 2022-05-13 PROCEDURE — 85027 COMPLETE CBC AUTOMATED: CPT | Performed by: PHYSICIAN ASSISTANT

## 2022-05-13 PROCEDURE — 96375 TX/PRO/DX INJ NEW DRUG ADDON: CPT

## 2022-05-13 PROCEDURE — 250N000013 HC RX MED GY IP 250 OP 250 PS 637: Performed by: PHYSICIAN ASSISTANT

## 2022-05-13 PROCEDURE — 45331 SIGMOIDOSCOPY AND BIOPSY: CPT | Performed by: INTERNAL MEDICINE

## 2022-05-13 PROCEDURE — 36415 COLL VENOUS BLD VENIPUNCTURE: CPT | Performed by: PHYSICIAN ASSISTANT

## 2022-05-13 PROCEDURE — 0DBG8ZX EXCISION OF LEFT LARGE INTESTINE, VIA NATURAL OR ARTIFICIAL OPENING ENDOSCOPIC, DIAGNOSTIC: ICD-10-PCS | Performed by: INTERNAL MEDICINE

## 2022-05-13 PROCEDURE — 250N000013 HC RX MED GY IP 250 OP 250 PS 637: Performed by: INTERNAL MEDICINE

## 2022-05-13 PROCEDURE — 250N000011 HC RX IP 250 OP 636: Performed by: PHYSICIAN ASSISTANT

## 2022-05-13 PROCEDURE — 99232 SBSQ HOSP IP/OBS MODERATE 35: CPT | Performed by: INTERNAL MEDICINE

## 2022-05-13 PROCEDURE — 80048 BASIC METABOLIC PNL TOTAL CA: CPT | Performed by: PHYSICIAN ASSISTANT

## 2022-05-13 PROCEDURE — 120N000004 HC R&B MS OVERFLOW

## 2022-05-13 PROCEDURE — 88305 TISSUE EXAM BY PATHOLOGIST: CPT | Mod: TC | Performed by: INTERNAL MEDICINE

## 2022-05-13 PROCEDURE — 96376 TX/PRO/DX INJ SAME DRUG ADON: CPT

## 2022-05-13 PROCEDURE — G0500 MOD SEDAT ENDO SERVICE >5YRS: HCPCS | Performed by: INTERNAL MEDICINE

## 2022-05-13 RX ORDER — DIPHENHYDRAMINE HYDROCHLORIDE 50 MG/ML
25-50 INJECTION INTRAMUSCULAR; INTRAVENOUS
Status: DISCONTINUED | OUTPATIENT
Start: 2022-05-13 | End: 2022-05-13 | Stop reason: HOSPADM

## 2022-05-13 RX ORDER — LIDOCAINE 40 MG/G
CREAM TOPICAL
Status: DISCONTINUED | OUTPATIENT
Start: 2022-05-13 | End: 2022-05-13 | Stop reason: HOSPADM

## 2022-05-13 RX ORDER — ALBUTEROL SULFATE 90 UG/1
2 AEROSOL, METERED RESPIRATORY (INHALATION) EVERY 6 HOURS PRN
Status: DISCONTINUED | OUTPATIENT
Start: 2022-05-13 | End: 2022-05-14 | Stop reason: HOSPADM

## 2022-05-13 RX ORDER — NALOXONE HYDROCHLORIDE 0.4 MG/ML
0.2 INJECTION, SOLUTION INTRAMUSCULAR; INTRAVENOUS; SUBCUTANEOUS
Status: DISCONTINUED | OUTPATIENT
Start: 2022-05-13 | End: 2022-05-13 | Stop reason: HOSPADM

## 2022-05-13 RX ORDER — NALOXONE HYDROCHLORIDE 0.4 MG/ML
0.4 INJECTION, SOLUTION INTRAMUSCULAR; INTRAVENOUS; SUBCUTANEOUS
Status: DISCONTINUED | OUTPATIENT
Start: 2022-05-13 | End: 2022-05-13 | Stop reason: HOSPADM

## 2022-05-13 RX ORDER — SIMETHICONE 40MG/0.6ML
133 SUSPENSION, DROPS(FINAL DOSAGE FORM)(ML) ORAL
Status: DISCONTINUED | OUTPATIENT
Start: 2022-05-13 | End: 2022-05-13 | Stop reason: HOSPADM

## 2022-05-13 RX ORDER — FENTANYL CITRATE 0.05 MG/ML
50-100 INJECTION, SOLUTION INTRAMUSCULAR; INTRAVENOUS EVERY 5 MIN PRN
Status: DISCONTINUED | OUTPATIENT
Start: 2022-05-13 | End: 2022-05-13 | Stop reason: HOSPADM

## 2022-05-13 RX ORDER — ALBUTEROL SULFATE 90 UG/1
2 AEROSOL, METERED RESPIRATORY (INHALATION) EVERY MORNING
Status: DISCONTINUED | OUTPATIENT
Start: 2022-05-13 | End: 2022-05-13

## 2022-05-13 RX ORDER — FLUMAZENIL 0.1 MG/ML
0.2 INJECTION, SOLUTION INTRAVENOUS
Status: ACTIVE | OUTPATIENT
Start: 2022-05-13 | End: 2022-05-14

## 2022-05-13 RX ORDER — EPINEPHRINE 1 MG/ML
0.1 INJECTION, SOLUTION INTRAMUSCULAR; SUBCUTANEOUS
Status: DISCONTINUED | OUTPATIENT
Start: 2022-05-13 | End: 2022-05-13 | Stop reason: HOSPADM

## 2022-05-13 RX ORDER — FLUMAZENIL 0.1 MG/ML
0.2 INJECTION, SOLUTION INTRAVENOUS
Status: DISCONTINUED | OUTPATIENT
Start: 2022-05-13 | End: 2022-05-13 | Stop reason: HOSPADM

## 2022-05-13 RX ORDER — ATROPINE SULFATE 0.1 MG/ML
1 INJECTION INTRAVENOUS
Status: DISCONTINUED | OUTPATIENT
Start: 2022-05-13 | End: 2022-05-13 | Stop reason: HOSPADM

## 2022-05-13 RX ORDER — METHYLPREDNISOLONE SODIUM SUCCINATE 40 MG/ML
20 INJECTION, POWDER, LYOPHILIZED, FOR SOLUTION INTRAMUSCULAR; INTRAVENOUS EVERY 8 HOURS
Status: DISCONTINUED | OUTPATIENT
Start: 2022-05-13 | End: 2022-05-14 | Stop reason: HOSPADM

## 2022-05-13 RX ADMIN — FENTANYL CITRATE 50 MCG: 0.05 INJECTION, SOLUTION INTRAMUSCULAR; INTRAVENOUS at 16:25

## 2022-05-13 RX ADMIN — OXYCODONE HYDROCHLORIDE 5 MG: 5 TABLET ORAL at 11:26

## 2022-05-13 RX ADMIN — HYDROMORPHONE HYDROCHLORIDE 0.2 MG: 0.2 INJECTION, SOLUTION INTRAMUSCULAR; INTRAVENOUS; SUBCUTANEOUS at 06:38

## 2022-05-13 RX ADMIN — MIDAZOLAM 1 MG: 1 INJECTION INTRAMUSCULAR; INTRAVENOUS at 16:27

## 2022-05-13 RX ADMIN — HYDROMORPHONE HYDROCHLORIDE 0.2 MG: 0.2 INJECTION, SOLUTION INTRAMUSCULAR; INTRAVENOUS; SUBCUTANEOUS at 20:05

## 2022-05-13 RX ADMIN — ONDANSETRON 4 MG: 2 INJECTION INTRAMUSCULAR; INTRAVENOUS at 11:27

## 2022-05-13 RX ADMIN — ZOLPIDEM TARTRATE 5 MG: 5 TABLET ORAL at 22:23

## 2022-05-13 RX ADMIN — HYDROMORPHONE HYDROCHLORIDE 0.2 MG: 0.2 INJECTION, SOLUTION INTRAMUSCULAR; INTRAVENOUS; SUBCUTANEOUS at 10:14

## 2022-05-13 RX ADMIN — VITAM B12 100 MCG: 100 TAB at 08:07

## 2022-05-13 RX ADMIN — ALBUTEROL SULFATE 2 PUFF: 90 INHALANT RESPIRATORY (INHALATION) at 10:14

## 2022-05-13 RX ADMIN — FENTANYL CITRATE 50 MCG: 0.05 INJECTION, SOLUTION INTRAMUSCULAR; INTRAVENOUS at 16:23

## 2022-05-13 RX ADMIN — METHYLPREDNISOLONE SODIUM SUCCINATE 20 MG: 40 INJECTION, POWDER, FOR SOLUTION INTRAMUSCULAR; INTRAVENOUS at 11:27

## 2022-05-13 RX ADMIN — HYDROMORPHONE HYDROCHLORIDE 0.2 MG: 0.2 INJECTION, SOLUTION INTRAMUSCULAR; INTRAVENOUS; SUBCUTANEOUS at 12:38

## 2022-05-13 RX ADMIN — SODIUM CHLORIDE, POTASSIUM CHLORIDE, SODIUM LACTATE AND CALCIUM CHLORIDE: 600; 310; 30; 20 INJECTION, SOLUTION INTRAVENOUS at 14:29

## 2022-05-13 RX ADMIN — METHYLPREDNISOLONE SODIUM SUCCINATE 20 MG: 40 INJECTION, POWDER, FOR SOLUTION INTRAMUSCULAR; INTRAVENOUS at 18:45

## 2022-05-13 RX ADMIN — MIDAZOLAM 2 MG: 1 INJECTION INTRAMUSCULAR; INTRAVENOUS at 16:22

## 2022-05-13 RX ADMIN — HYDROMORPHONE HYDROCHLORIDE 0.2 MG: 0.2 INJECTION, SOLUTION INTRAMUSCULAR; INTRAVENOUS; SUBCUTANEOUS at 03:45

## 2022-05-13 RX ADMIN — MIDAZOLAM 1 MG: 1 INJECTION INTRAMUSCULAR; INTRAVENOUS at 16:28

## 2022-05-13 RX ADMIN — SODIUM CHLORIDE, POTASSIUM CHLORIDE, SODIUM LACTATE AND CALCIUM CHLORIDE: 600; 310; 30; 20 INJECTION, SOLUTION INTRAVENOUS at 04:22

## 2022-05-13 RX ADMIN — ACETAMINOPHEN 650 MG: 325 TABLET, FILM COATED ORAL at 06:38

## 2022-05-13 ASSESSMENT — ACTIVITIES OF DAILY LIVING (ADL)
ADLS_ACUITY_SCORE: 18

## 2022-05-13 NOTE — OR NURSING
Patient tolerated flexible sigmoidoscopy. VSS.  All findings were discussed with patient after procedure and all questions answered.  Report called to pediatrics, and transferred back to floor after recovery.

## 2022-05-13 NOTE — PROGRESS NOTES
[facility]    Medicine Progress Note - Hospitalist Service    Date of Admission:  5/12/2022    Assessment & Plan          Bear Abdi is a 32 year old male with history of untreated ulcerative colitis and anemia of chronic disease. He presented to the [facility] ED on 5/12/22 with abdominal pain and hematochezia.   Emergency department evaluation showed heart rate of 100 but otherwise unremarkable vital signs.  Laboratory evaluation showed CRP of 16.4, albumin 3.3, hemoglobin 8.4, and negative testing for C. difficile, enteric bacteria and viruses, COVID, hepatitis B, and hepatitis A.  CT of abdomen and pelvis showed moderate colitis consistent with ulcerative colitis.  There was no obstruction or abscess.  Bear was admitted to the hospital with suspected acute flare of ulcerative colitis.  Once C. difficile testing came back negative he was started on Solu-Medrol.  He was given IV fluid, antiemetics, and analgesics.  Gastroenterology was consulted and planned flexible sigmoidoscopy.    Problem list:     Acute exacerbation of ulcerative colitis  Untreated ulcerative colitis  Anemia, presumably of chronic disease  -Pain persist but has improved  -Anemia remains stable  -GI consult appreciated; flexible sigmoidoscopy today  -Defer diet advancement to GI after procedure  -Monitor hemoglobin.  Consider transfusion for hemoglobin 7 or less     Diet: Combination Diet Clear Liquid    DVT Prophylaxis: Pneumatic Compression Devices  Jiang Catheter: Not present  Central Lines: None  Cardiac Monitoring: None  Code Status: Full Code      Disposition Plan   Expected Discharge: 05/14/2022     Anticipated discharge location: Home         The patient's care was discussed with the Bedside Nurse, Patient and Gastroenterology Consultant.    Tomas Sánchez MD  Hospitalist Service  [facility]  Securely message with the Vocera Web Console (learn more here)  Text page via [employer]  [employer]ie[employer]/Directory                ______________________________________________________________________    Interval History   Patient is still having some abdominal pain but it is better.  No chest pain, shortness of breath, nausea, or vomiting.  He has had a little bit of stooling.    Data reviewed today: I reviewed all medications, new labs and imaging results over the last 24 hours. I personally reviewed no images or EKG's today.    Physical Exam   Vital Signs: Temp: 98.8  F (37.1  C) Temp src: Oral BP: 125/75 Pulse: 77   Resp: 18 SpO2: 98 % O2 Device: None (Room air)    Weight: 158 lbs 1.12 oz  GENERAL:  Comfortable at rest. Cooperative.  PSYCH: pleasant, oriented, No acute distress.  EYES: PERRLA, Normal conjunctiva.  HEART:  Regular rate and rhythm. No JVD. Pulses normal. No edema.  LUNGS:  Clear to auscultation, normal Respiratory effort.  ABDOMEN:  Soft, no hepatosplenomegaly, normal bowel sounds.  Mild to moderate generalized discomfort  EXTREMETIES: No clubbing, cyanosis or ischemia  SKIN:  Dry to touch, No rash.      Data   Recent Labs   Lab 05/13/22  0709 05/12/22  1928 05/12/22  0930 05/11/22 2018   WBC 5.5  --  6.4 6.1   HGB 7.6* 7.5* 8.4* 7.5*   MCV 84  --  83 83     --  494* 493*     --  136  --    POTASSIUM 4.3  --  3.7  --    CHLORIDE 106  --  105  --    CO2 26  --  25  --    BUN 8  --  11  --    CR 0.95  --  0.94  --    ANIONGAP 5  --  6  --    RUTH 8.4*  --  8.5  --    GLC 81  --  92  --    ALBUMIN  --   --  3.3*  --    PROTTOTAL  --   --  6.6*  --    BILITOTAL  --   --  0.3  --    ALKPHOS  --   --  54  --    ALT  --   --  18  --    AST  --   --  15  --

## 2022-05-13 NOTE — PROGRESS NOTES
RN Note [id number]    Updates/Plan: Patient was anxious during shift: hungry from clears diet, anxious to start steroid, wants to have sigmoidoscopy as soon as possible today. Currently waiting for endoscopy to call for patient's sigmoidoscopy to start.      Orientation: Alert and oriented x4  VS:  WL  LS: Clear and equal bilaterally.   GI: Passing copious flatus, 1 bowel movement this shift, loose, brown with red streaks.    : Voiding adequately  Skin: WDL  IV: WDL, infusing.  Activity: Independent in room.     Pain: Rates 7 to 8 out of 10.  PRN dilaudid and oxy given.

## 2022-05-13 NOTE — PLAN OF CARE
Updates: VSS. Independent in room. Pt rested comfortably between cares. Remicade prelabs done, blood tx consent signed in chart.  Resp: WDL  GI/: WDL x abdominal pain and frequent loose stools, cdiff negative. + flatus, numerous loose, dark brown/blood streaked BM this shift. 1930 HgB 7.5, asymptomatic, AM labs pending (hx anemia). Lower abdomen tender and soft to palpation. Denies N/V. Been NPO since midnight. Voiding spontaneously, UO WDL.  IV: WDL infusing LR @100 overnight  Pain/Comfort: 4-8/10. Tylenol and dilaudid providing adequate pain management per pt.  Skin: WDL  Social: Wife Yudy at bedside this evening.  Plan: Plan for sigmoidoscopy ~1415 today, monitor pain and comfort. Awaiting results of AM labs. Continue with current care plan.

## 2022-05-14 VITALS
RESPIRATION RATE: 18 BRPM | DIASTOLIC BLOOD PRESSURE: 83 MMHG | WEIGHT: 158.07 LBS | SYSTOLIC BLOOD PRESSURE: 124 MMHG | HEART RATE: 77 BPM | TEMPERATURE: 98 F | OXYGEN SATURATION: 98 %

## 2022-05-14 LAB
ANION GAP SERPL CALCULATED.3IONS-SCNC: 3 MMOL/L (ref 3–14)
BUN SERPL-MCNC: 10 MG/DL (ref 7–30)
CALCIUM SERPL-MCNC: 8.8 MG/DL (ref 8.5–10.1)
CHLORIDE BLD-SCNC: 104 MMOL/L (ref 94–109)
CO2 SERPL-SCNC: 29 MMOL/L (ref 20–32)
CREAT SERPL-MCNC: 0.78 MG/DL (ref 0.66–1.25)
ERYTHROCYTE [DISTWIDTH] IN BLOOD BY AUTOMATED COUNT: 13.7 % (ref 10–15)
GFR SERPL CREATININE-BSD FRML MDRD: >90 ML/MIN/1.73M2
GLUCOSE BLD-MCNC: 137 MG/DL (ref 70–99)
HCT VFR BLD AUTO: 27.1 % (ref 40–53)
HGB BLD-MCNC: 8.1 G/DL (ref 13.3–17.7)
MCH RBC QN AUTO: 24.8 PG (ref 26.5–33)
MCHC RBC AUTO-ENTMCNC: 29.9 G/DL (ref 31.5–36.5)
MCV RBC AUTO: 83 FL (ref 78–100)
PLATELET # BLD AUTO: 496 10E3/UL (ref 150–450)
POTASSIUM BLD-SCNC: 4.5 MMOL/L (ref 3.4–5.3)
QUANTIFERON MITOGEN: 10 IU/ML
QUANTIFERON NIL TUBE: 0.1 IU/ML
QUANTIFERON TB1 TUBE: 0.09 IU/ML
QUANTIFERON TB2 TUBE: 0.09
RBC # BLD AUTO: 3.27 10E6/UL (ref 4.4–5.9)
SODIUM SERPL-SCNC: 136 MMOL/L (ref 133–144)
WBC # BLD AUTO: 8.4 10E3/UL (ref 4–11)

## 2022-05-14 PROCEDURE — 250N000011 HC RX IP 250 OP 636: Performed by: INTERNAL MEDICINE

## 2022-05-14 PROCEDURE — G0378 HOSPITAL OBSERVATION PER HR: HCPCS

## 2022-05-14 PROCEDURE — 258N000003 HC RX IP 258 OP 636: Performed by: PHYSICIAN ASSISTANT

## 2022-05-14 PROCEDURE — 85027 COMPLETE CBC AUTOMATED: CPT | Performed by: INTERNAL MEDICINE

## 2022-05-14 PROCEDURE — 250N000013 HC RX MED GY IP 250 OP 250 PS 637: Performed by: PHYSICIAN ASSISTANT

## 2022-05-14 PROCEDURE — 82310 ASSAY OF CALCIUM: CPT | Performed by: INTERNAL MEDICINE

## 2022-05-14 PROCEDURE — 99239 HOSP IP/OBS DSCHRG MGMT >30: CPT | Performed by: INTERNAL MEDICINE

## 2022-05-14 PROCEDURE — 250N000011 HC RX IP 250 OP 636: Performed by: PHYSICIAN ASSISTANT

## 2022-05-14 PROCEDURE — 96376 TX/PRO/DX INJ SAME DRUG ADON: CPT

## 2022-05-14 PROCEDURE — 36415 COLL VENOUS BLD VENIPUNCTURE: CPT | Performed by: INTERNAL MEDICINE

## 2022-05-14 RX ORDER — ACETAMINOPHEN 500 MG
500-1000 TABLET ORAL EVERY 6 HOURS PRN
Qty: 100 TABLET | Refills: 0 | Status: SHIPPED | OUTPATIENT
Start: 2022-05-14

## 2022-05-14 RX ORDER — OXYCODONE HYDROCHLORIDE 5 MG/1
5 TABLET ORAL EVERY 6 HOURS PRN
Qty: 10 TABLET | Refills: 0 | Status: SHIPPED | OUTPATIENT
Start: 2022-05-14

## 2022-05-14 RX ORDER — FERROUS GLUCONATE 324(38)MG
324 TABLET ORAL
Qty: 30 TABLET | Refills: 0 | Status: SHIPPED | OUTPATIENT
Start: 2022-05-14

## 2022-05-14 RX ORDER — PREDNISONE 5 MG/1
TABLET ORAL
Qty: 252 TABLET | Refills: 0 | Status: SHIPPED | OUTPATIENT
Start: 2022-05-14

## 2022-05-14 RX ADMIN — ACETAMINOPHEN 650 MG: 325 TABLET, FILM COATED ORAL at 08:47

## 2022-05-14 RX ADMIN — METHYLPREDNISOLONE SODIUM SUCCINATE 20 MG: 40 INJECTION, POWDER, FOR SOLUTION INTRAMUSCULAR; INTRAVENOUS at 10:38

## 2022-05-14 RX ADMIN — HYDROMORPHONE HYDROCHLORIDE 0.2 MG: 0.2 INJECTION, SOLUTION INTRAMUSCULAR; INTRAVENOUS; SUBCUTANEOUS at 02:28

## 2022-05-14 RX ADMIN — HYDROMORPHONE HYDROCHLORIDE 0.2 MG: 0.2 INJECTION, SOLUTION INTRAMUSCULAR; INTRAVENOUS; SUBCUTANEOUS at 05:41

## 2022-05-14 RX ADMIN — METHYLPREDNISOLONE SODIUM SUCCINATE 20 MG: 40 INJECTION, POWDER, FOR SOLUTION INTRAMUSCULAR; INTRAVENOUS at 02:26

## 2022-05-14 RX ADMIN — SODIUM CHLORIDE, POTASSIUM CHLORIDE, SODIUM LACTATE AND CALCIUM CHLORIDE: 600; 310; 30; 20 INJECTION, SOLUTION INTRAVENOUS at 02:15

## 2022-05-14 RX ADMIN — VITAM B12 100 MCG: 100 TAB at 07:26

## 2022-05-14 ASSESSMENT — ACTIVITIES OF DAILY LIVING (ADL)
ADLS_ACUITY_SCORE: 18

## 2022-05-14 NOTE — PLAN OF CARE
/71   Pulse 85   Temp 98.5  F (36.9  C) (Oral)   Resp 16   Wt 71.7 kg (158 lb 1.1 oz)   SpO2 96%   Neuro: WDL  Cardiac: WDL  Lungs: WDL  GI: Normoactive bowel sounds.  Some intermittent cramping.  : WDL  Pain: 6/10 at 2100.  IV: LR @ 100 ml per hour  Meds: Dilauded at 2100 and Ambien at 2230 for sleep.  Labs/tests: N/A this shift.  Diet: well tolerated.  Activity: Independent  Misc: N/A  Plan: Continue to manage pain overnight.    Patient is stable and on room air.  Independent, on a full liquid diet. Afebrile.  Will continue to monitor and provide cares.

## 2022-05-14 NOTE — PROGRESS NOTES
Woke once when IV beeped. Rated abdominal pain as 7. Described it as sharp, tight, and ache. Received Dilaudid twice with some relief. Using heating pad to abdomen. No stool over night. Face is pale. Denies dizziness with ambulation. C/O IV fluids causing him to wake and have to urinate. Tolerated water. Eager to have breakfast.  Active bowel sounds x 4. Encourage PO pain medication. Monitor GI function.

## 2022-05-14 NOTE — PLAN OF CARE
A&O x 4. Pt stable and ready for discharge, Vital signs stable. Discharge instructions, signs and symptoms to report, new medications, diet, activity, and follow up appointments reviewed with patient. New Rx sent home with pt and education on each new medication.   All questions answered and verbalized understanding.    IV out and belongings sent home with pt      Goal Outcome Evaluation:    Plan of Care Reviewed With: patient

## 2022-05-14 NOTE — DISCHARGE SUMMARY
[facility]  Hospitalist Discharge Summary      Date of Admission:  5/12/2022  Date of Discharge:  5/14/2022  Discharging Provider: Tomas Sánchez MD  Discharge Service: Hospitalist Service    Discharge Diagnoses     Acute exacerbation of ulcerative colitis  Untreated ulcerative colitis  Anemia, presumably of chronic disease    Follow-ups Needed After Discharge   Follow-up Appointments     Follow-up and recommended labs and tests       Follow up with [facility] (Dr. Read or colleague) for follow up of ulcerative   colitis. [facility] will call you to schedule appointment next week. If you   have not heard from [facility] by the end of next week call to make appointment   (nurse will provide [facility] business card)             Unresulted Labs Ordered in the Past 30 Days of this Admission     Date and Time Order Name Status Description    5/13/2022  4:39 PM Surgical Pathology Exam In process     5/12/2022 12:12 PM Quantiferon TB Gold Plus Purple Tube In process     5/12/2022 12:12 PM Quantiferon TB Gold Plus Yellow Tube In process     5/12/2022 12:12 PM Quantiferon TB Gold Plus Green Tube In process     5/12/2022 12:12 PM Quantiferon TB Gold Plus Grey Tube In process       These results will be followed up by Hospitalist    Discharge Disposition   Discharged to home  Condition at discharge: Stable      Hospital Course      Bear Abdi is a 32 year old male with history of untreated ulcerative colitis and anemia of chronic disease. He presented to the [facility] ED on 5/12/22 with abdominal pain and hematochezia.   Emergency department evaluation showed heart rate of 100 but otherwise unremarkable vital signs.  Laboratory evaluation showed CRP of 16.4, albumin 3.3, hemoglobin 8.4, and negative testing for C. difficile, enteric bacteria and viruses, COVID, hepatitis B, and hepatitis A.  CT of abdomen and pelvis showed moderate colitis consistent with ulcerative colitis.  There was no obstruction or abscess.   Bear was admitted to the hospital with suspected acute flare of ulcerative colitis.  Once C. difficile testing came back negative he was started on Solu-Medrol.  He was given IV fluid, antiemetics, and analgesics. Gastroenterology was consulted and performed flexible sigmoidoscopy which corroborated diagnosis of ulcerative colitis flare.  Bear improved dramatically and discharged home on 5/14/22. He was prescribed an 8 week prednisone taper, oxycodone (#10), tylenol (#100), and ferous gluconate (#30).  He will follow up with Dr. Read at [facility] in the next several weeks.     Consultations This Hospital Stay   GASTROENTEROLOGY IP CONSULT    Code Status   Full Code    Time Spent on this Encounter   I, Tomas Sánchez MD, personally saw the patient today and spent greater than 30 minutes discharging this patient.       Tomas Sánchez MD  [facility]  [address] [place] [address]  Phone: [phone]  Fax: [phone]  ______________________________________________________________________    Physical Exam   Vital Signs: Temp: 98  F (36.7  C) Temp src: Oral BP: 124/83 Pulse: 77   Resp: 18 SpO2: 98 % O2 Device: None (Room air)    Weight: 158 lbs 1.12 oz  GENERAL:  Comfortable. Cooperative.  PSYCH: pleasant, oriented, No acute distress.  EYES: PERRLA, Normal conjunctiva.  HEART:  Regular rate and rhythm. No JVD. Pulses normal. No edema.  LUNGS:  Clear to auscultation, normal Respiratory effort.  ABDOMEN:  Soft, no hepatosplenomegaly, normal bowel sounds.  EXTREMETIES: No clubbing, cyanosis or ischemia  SKIN:  Dry to touch, No rash.         Primary Care Physician   Physician No Ref-Primary    Discharge Orders      Reason for your hospital stay    Acute flare of ulcerative colitis     Follow-up and recommended labs and tests     Follow up with [facility] (Dr. Read or colleague) for follow up of ulcerative colitis. [facility] will call you to schedule appointment next week. If you  have not heard from [facility] by the end of next week call to make appointment (nurse will provide [facility] business card)     Activity    Your activity upon discharge: activity as tolerated     Diet    Follow this diet upon discharge: Orders Placed This Encounter      Low Fiber Diet       Significant Results and Procedures   Most Recent 3 CBC's:Recent Labs   Lab Test 05/14/22  0710 05/13/22  0709 05/12/22  1928 05/12/22  0930   WBC 8.4 5.5  --  6.4   HGB 8.1* 7.6* 7.5* 8.4*   MCV 83 84  --  83   * 389  --  494*     Most Recent 3 BMP's:Recent Labs   Lab Test 05/14/22  0710 05/13/22  0709 05/12/22  0930    137 136   POTASSIUM 4.5 4.3 3.7   CHLORIDE 104 106 105   CO2 29 26 25   BUN 10 8 11   CR 0.78 0.95 0.94   ANIONGAP 3 5 6   RUTH 8.8 8.4* 8.5   * 81 92     Most Recent 6 Bacteria Isolates From Any Culture (See EPIC Reports for Culture Details):No lab results found.,   Results for orders placed or performed during the hospital encounter of 05/12/22   CT Abdomen Pelvis w Contrast    Narrative    CT ABDOMEN AND PELVIS WITH CONTRAST 5/12/2022 10:48 AM    CLINICAL HISTORY: Abdominal abscess/infection suspected. History of  ulcerative colitis. Diffuse pain, bloody stools.    TECHNIQUE: CT scan of the abdomen and pelvis was performed following  injection of IV contrast. Multiplanar reformats were obtained. Dose  reduction techniques were used.  CONTRAST: 81mL Isovue-370    COMPARISON: None.    FINDINGS:   LOWER CHEST: Normal.    HEPATOBILIARY: Normal.    PANCREAS: Normal.    SPLEEN: Subcentimeter splenic lesions are indeterminate, but commonly  benign.    ADRENAL GLANDS: Normal.    KIDNEYS/BLADDER: Normal.    BOWEL: Diffuse colonic wall thickening most severe in the sigmoid,  descending, and transverse colon. Engorgement of the adjacent vasa  recta. Numerous enlarged reactive lymph nodes in the adjacent  mesocolon.    PELVIC ORGANS: Normal.    ADDITIONAL FINDINGS: None.    MUSCULOSKELETAL: Normal.       Impression    IMPRESSION:   1.  Moderate colitis consistent with history of ulcerative colitis. No  obstruction or abscess.    SUMEET ULLOA MD         SYSTEM ID:  [id number]   XR Chest Port 1 View    Narrative    XR CHEST PORT 1 VIEW  5/12/2022 11:16 AM       INDICATION: covid +  COMPARISON: None.       Impression    IMPRESSION: Negative chest.    SUMEET ULLOA MD         SYSTEM ID:  [id number]       Discharge Medications   Current Discharge Medication List      START taking these medications    Details   acetaminophen (TYLENOL) 500 MG tablet Take 1-2 tablets (500-1,000 mg) by mouth every 6 hours as needed for pain  Qty: 100 tablet, Refills: 0    Associated Diagnoses: Pain      ferrous gluconate (FERGON) 324 (38 Fe) MG tablet Take 1 tablet (324 mg) by mouth daily (with breakfast)  Qty: 30 tablet, Refills: 0    Associated Diagnoses: Anemia, unspecified type      oxyCODONE (ROXICODONE) 5 MG tablet Take 1 tablet (5 mg) by mouth every 6 hours as needed for moderate to severe pain  Qty: 10 tablet, Refills: 0    Associated Diagnoses: Pain      predniSONE (DELTASONE) 5 MG tablet By mouth, take 40 mg by mouth daily. Every 7 days reduce daily dose by 5 mg until complete. Taper should take 8 weeks.  Qty: 252 tablet, Refills: 0    Associated Diagnoses: Exacerbation of ulcerative colitis with complication (H)           Allergies   Allergies   Allergen Reactions     Melatonin Other (See Comments)     Restless legs

## 2022-05-15 LAB
GAMMA INTERFERON BACKGROUND BLD IA-ACNC: 0.1 IU/ML
M TB IFN-G BLD-IMP: NEGATIVE
M TB IFN-G CD4+ BCKGRND COR BLD-ACNC: 9.9 IU/ML
MITOGEN IGNF BCKGRD COR BLD-ACNC: -0.01 IU/ML
MITOGEN IGNF BCKGRD COR BLD-ACNC: -0.01 IU/ML

## 2022-05-17 LAB
PATH REPORT.COMMENTS IMP SPEC: NORMAL
PATH REPORT.COMMENTS IMP SPEC: NORMAL
PATH REPORT.FINAL DX SPEC: NORMAL
PATH REPORT.GROSS SPEC: NORMAL
PATH REPORT.MICROSCOPIC SPEC OTHER STN: NORMAL
PATH REPORT.RELEVANT HX SPEC: NORMAL
PHOTO IMAGE: NORMAL

## 2022-06-26 ENCOUNTER — HEALTH MAINTENANCE LETTER (OUTPATIENT)
Age: 32
End: 2022-06-26

## 2022-11-21 ENCOUNTER — HEALTH MAINTENANCE LETTER (OUTPATIENT)
Age: 32
End: 2022-11-21

## 2023-07-09 ENCOUNTER — HEALTH MAINTENANCE LETTER (OUTPATIENT)
Age: 33
End: 2023-07-09

## 2024-09-01 ENCOUNTER — HEALTH MAINTENANCE LETTER (OUTPATIENT)
Age: 34
End: 2024-09-01

## (undated) DEVICE — ENDO FORCEP ENDOJAW BIOPSY 2.8MMX230CM FB-220U

## (undated) DEVICE — KIT ENDO TURNOVER/PROCEDURE W/CLEAN A SCOPE LINERS 103888

## (undated) RX ORDER — FENTANYL CITRATE 0.05 MG/ML
INJECTION, SOLUTION INTRAMUSCULAR; INTRAVENOUS
Status: DISPENSED
Start: 2022-05-13